# Patient Record
Sex: FEMALE | Race: WHITE
[De-identification: names, ages, dates, MRNs, and addresses within clinical notes are randomized per-mention and may not be internally consistent; named-entity substitution may affect disease eponyms.]

---

## 2018-06-24 ENCOUNTER — HOSPITAL ENCOUNTER (EMERGENCY)
Dept: HOSPITAL 80 - FED | Age: 23
Discharge: HOME | End: 2018-06-24
Payer: COMMERCIAL

## 2018-06-24 VITALS — DIASTOLIC BLOOD PRESSURE: 69 MMHG | SYSTOLIC BLOOD PRESSURE: 126 MMHG

## 2018-06-24 DIAGNOSIS — B96.20: ICD-10-CM

## 2018-06-24 DIAGNOSIS — Z87.891: ICD-10-CM

## 2018-06-24 DIAGNOSIS — N10: Primary | ICD-10-CM

## 2018-06-24 DIAGNOSIS — E86.9: ICD-10-CM

## 2018-06-24 LAB — PLATELET # BLD: 212 10^3/UL (ref 150–400)

## 2018-06-24 NOTE — EDPHY
H & P


Stated Complaint: Fever, left side abd pain


Time Seen by Provider: 06/24/18 14:54


HPI/ROS: 





CHIEF COMPLAINT:  Left upper quadrant pain, fever





HISTORY OF PRESENT ILLNESS:  22-year-old female presents with left upper 

quadrant pain and fever.  Onset of fever 2 days ago, associated with multiple 

episodes of vomiting.  Unable to tolerate oral fluids.  Few episodes of loose 

stools.  Left sided abdominal pain started yesterday and has been persistent.  

The pain is constant and radiates to the left back.  History of urinary tract 

infection 2 weeks ago, treated with azo, sx resolved.  No abx.  No prior 

history of pyelonephritis.





REVIEW OF SYSTEMS:  complete 10 point ROS negative except at noted in the HPI








- Personal History


LMP (Females 10-55): IUD In Place


Current Tetanus Diphtheria and Acellular Pertussis (TDAP): Yes





- Medical/Surgical History


Hx Asthma: No


Hx Chronic Respiratory Disease: No


Hx Diabetes: No


Hx Cardiac Disease: No


Hx Renal Disease: No


Hx Cirrhosis: No


Hx Alcoholism: No


Hx HIV/AIDS: No


Hx Splenectomy or Spleen Trauma: No





- Social History


Smoking Status: Former smoker


Alcohol Use: Sober


Drug Use: None





- Physical Exam


Exam: 





General Appearance:  Alert, pleasant


Eyes:  Pupils equal and round, no conjunctival pallor or injection


ENT, Mouth:  Mucous membranes moist


Neck:  Normal inspection


Respiratory:  Lungs are clear to auscultation


Cardiovascular:  Regular rate and rhythm


Gastrointestinal:  Abdomen is soft, left upper quadrant tenderness


Back:  Left CVA tenderness


Neurological:  A&O, nonfocal, normal gait


Skin:  Warm and dry


Extremities:  Normal inspection


Psychiatric:  Mood and affect normal





Constitutional: 


 Initial Vital Signs











Temperature (C)  37 C   06/24/18 14:34


 


Heart Rate  119 H  06/24/18 14:34


 


Respiratory Rate  18   06/24/18 14:34


 


Blood Pressure  143/87 H  06/24/18 14:34


 


O2 Sat (%)  96   06/24/18 14:34








 











O2 Delivery Mode               Room Air














Allergies/Adverse Reactions: 


 





gluten Allergy (Verified 06/24/18 14:34)


 








Home Medications: 














 Medication  Instructions  Recorded


 


Cefdinir [Omnicef (*)] 300 mg PO BID #20 cap 06/24/18


 


Ondansetron Odt [Zofran Odt 4 mg 4 mg PO Q4 PRN #6 tab 06/24/18





(*)]  














Medical Decision Making


ED Course/Re-evaluation: 





This patient presents with left flank pain, vomiting and fever.  Symptoms most 

likely secondary to pyelonephritis.  IV normal saline 2 L and Zofran 4 mg


IV given.  Urinalysis reveals urinary tract infection.  Urine culture sent and 

Rocephin 1 g IV given.  Patient tolerated oral fluids well after IV Zofran.  

Abdominal exam remains benign.  Toradol 15 mg IV given prior to discharge for 

pain control.  Warning signs discussed.


Differential Diagnosis: 





Differential diagnosis includes though it is not limited to appendicitis, 

cholecystitis, diverticulitis, pyelonephritis, bowel perforation, small bowel 

obstruction.





- Data Points


Laboratory Results: 


 Laboratory Results





 06/24/18 15:45 





 06/24/18 15:45 





 











  06/24/18 06/24/18 06/24/18





  15:45 15:45 14:50


 


WBC    24.38 10^3/uL H 10^3/uL  





    (3.80-9.50)  


 


RBC    4.69 10^6/uL 10^6/uL  





    (4.18-5.33)  


 


Hgb    14.3 g/dL g/dL  





    (12.6-16.3)  


 


Hct    42.5 % %  





    (38.0-47.0)  


 


MCV    90.6 fL fL  





    (81.5-99.8)  


 


MCH    30.5 pg pg  





    (27.9-34.1)  


 


MCHC    33.6 g/dL g/dL  





    (32.4-36.7)  


 


RDW    11.9 % %  





    (11.5-15.2)  


 


Plt Count    212 10^3/uL 10^3/uL  





    (150-400)  


 


MPV    9.9 fL fL  





    (8.7-11.7)  


 


Neut % (Auto)    Not Reported   





    


 


Lymph % (Auto)    Not Reported   





    


 


Mono % (Auto)    Not Reported   





    


 


Eos % (Auto)    Not Reported   





    


 


Baso % (Auto)    Not Reported   





    


 


Nucleat RBC Rel Count    Not Reported   





    


 


Absolute Neuts (auto)    Not Reported   





    


 


Absolute Lymphs (auto)    Not Reported   





    


 


Absolute Monos (auto)    Not Reported   





    


 


Absolute Eos (auto)    Not Reported   





    


 


Absolute Basos (auto)    Not Reported   





    


 


Absolute Nucleated RBC    Not Reported   





    


 


Immature Gran %    Not Reported   





    


 


Seg Neutrophils %    71.0 % %  





    


 


Band Neutrophils %    23.0 % %  





    


 


Lymphocytes %    3.0 % %  





    


 


Monocytes %    3.0 % %  





    


 


Eosinophils %    0 % %  





    


 


Basophils %    0 % %  





    


 


Metamyelocytes %    0 % %  





    


 


Myelocytes %    0 % %  





    


 


Promyelocytes %    0 % %  





    


 


Blast Cells %    0 % %  





    


 


Immature Gran #    Not Reported   





    


 


Absolute Seg Neuts    17.31 10^/uL H 10^/uL  





    (1.70-6.50)  


 


Absolute Band Neuts    5.61 10^3/uL H 10^3/uL  





    (0.00-0.70)  


 


Absolute Lymphocytes    0.73 10^3/uL L 10^3/uL  





    (1.00-3.00)  


 


Absolute Monocytes    0.73 10^3/uL 10^3/uL  





    (0.30-0.80)  


 


Absolute Eosinophils    0.00 10^3/uL L 10^3/uL  





    (0.03-0.40)  


 


Absolute Basophils    0.00 10^3/uL L 10^3/uL  





    (0.02-0.10)  


 


Absolute Metamyelocyte    0.00 10^3/mL 10^3/mL  





    (0.00-0.00)  


 


Absolute Myelocytes    0.00 10^3/mL 10^3/mL  





    (0.00-0.00)  


 


Absolute Promyelocytes    0.00 10^3/uL 10^3/uL  





    (0.00-0.00)  


 


Absolute Plasma Cells    0.00 10^3/uL 10^3/uL  





    (0.00-0.00)  


 


Absolute Blast Cells    0.00 10^3/uL 10^3/uL  





    (0.00-0.00)  


 


Plasma Cells %    0 % %  





    


 


Platelet Estimate    ADEQUATE   





    (ADEQ)  


 


Acanthocytes (Spur)    1+  H   





    


 


Sodium  136 mEq/L mEq/L    





   (135-145)   


 


Potassium  3.5 mEq/L mEq/L    





   (3.3-5.0)   


 


Chloride  102 mEq/L mEq/L    





   ()   


 


Carbon Dioxide  20 mEq/l L mEq/l    





   (22-31)   


 


Anion Gap  14 mEq/L mEq/L    





   (8-16)   


 


BUN  10 mg/dL mg/dL    





   (7-23)   


 


Creatinine  0.9 mg/dL mg/dL    





   (0.6-1.0)   


 


Estimated GFR  > 60     





    


 


Glucose  110 mg/dL H mg/dL    





   ()   


 


Calcium  9.3 mg/dL mg/dL    





   (8.5-10.4)   


 


Urine Color      YELLOW 





    


 


Urine Appearance      MODERATELY TURBID 





    


 


Urine pH      5.0 





     (5.0-7.5) 


 


Ur Specific Gravity      1.019 





     (1.002-1.030) 


 


Urine Protein      2+  H 





     (NEGATIVE) 


 


Urine Ketones      1+  H 





     (NEGATIVE) 


 


Urine Blood      2+  H 





     (NEGATIVE) 


 


Urine Nitrate      NEGATIVE 





     (NEGATIVE) 


 


Urine Bilirubin      NEGATIVE 





     (NEGATIVE) 


 


Urine Urobilinogen      2.0 EU H EU





     (0.2-1.0) 


 


Ur Leukocyte Esterase      2+  H 





     (NEGATIVE) 


 


Urine RBC      15-25 /hpf H /hpf





     (0-3) 


 


Urine WBC       /hpf H /hpf





     (0-3) 


 


Ur Epithelial Cells      TRACE /lpf /lpf





     (NONE-1+) 


 


Urine Mucus      TRACE /lpf /lpf





     (NONE-1+) 


 


Urine Glucose      NEGATIVE 





     (NEGATIVE) 











Medications Given: 


 








Discontinued Medications





Sodium Chloride (Ns)  1,000 mls @ 0 mls/hr IV EDNOW ONE; Wide Open


   PRN Reason: Protocol


   Stop: 06/24/18 15:07


   Last Admin: 06/24/18 15:15 Dose:  1,000 mls


Ceftriaxone Sodium/Dextrose (Rocephin 1 Gm (Premix))  50 mls @ 100 mls/hr IV 

EDNOW ONE


   PRN Reason: Protocol


   Stop: 06/24/18 16:10


   Last Admin: 06/24/18 15:57 Dose:  50 mls


Sodium Chloride (Ns)  1,000 mls @ 0 mls/hr IV ONCE ONE; Wide Open


   PRN Reason: Protocol


   Stop: 06/24/18 16:05


   Last Admin: 06/24/18 16:05 Dose:  1,000 mls


Ondansetron HCl (Zofran)  4 mg IVP EDNOW ONE


   Stop: 06/24/18 15:07


   Last Admin: 06/24/18 15:57 Dose:  4 mg








Departure





- Departure


Disposition: Home, Routine, Self-Care


Clinical Impression: 


 Acute pyelonephritis





Condition: Good


Instructions:  Kidney Infection (ED)


Additional Instructions: 


Drink plenty of fluids.


Take Tylenol every 4 hr as needed for fever and pain.  You may also take 

ibuprofen for fever and pain every 6 hr.


Referrals: 


Barry Canales MD [Medical Doctor] - 2-3 days, call for appt.


Prescriptions: 


Cefdinir [Omnicef (*)] 300 mg PO BID #20 cap


Ondansetron Odt [Zofran Odt 4 mg (*)] 4 mg PO Q4 PRN #6 tab


 PRN Reason: Nausea